# Patient Record
Sex: MALE | Race: WHITE | URBAN - METROPOLITAN AREA
[De-identification: names, ages, dates, MRNs, and addresses within clinical notes are randomized per-mention and may not be internally consistent; named-entity substitution may affect disease eponyms.]

---

## 2020-03-11 ENCOUNTER — OFFICE VISIT (OUTPATIENT)
Dept: URBAN - METROPOLITAN AREA CLINIC 23 | Facility: CLINIC | Age: 70
End: 2020-03-11
Payer: MEDICARE

## 2020-03-11 DIAGNOSIS — H25.13 AGE-RELATED NUCLEAR CATARACT, BILATERAL: Primary | ICD-10-CM

## 2020-03-11 DIAGNOSIS — H40.033 ANATOMICAL NARROW ANGLE, BILATERAL: ICD-10-CM

## 2020-03-11 DIAGNOSIS — H52.4 PRESBYOPIA: ICD-10-CM

## 2020-03-11 PROCEDURE — 92004 COMPRE OPH EXAM NEW PT 1/>: CPT | Performed by: OPHTHALMOLOGY

## 2020-03-11 ASSESSMENT — VISUAL ACUITY
OS: 20/25
OD: 20/60

## 2020-03-11 ASSESSMENT — INTRAOCULAR PRESSURE
OS: 12
OD: 11

## 2020-03-11 ASSESSMENT — KERATOMETRY
OD: 44.13
OS: 43.88

## 2020-03-11 NOTE — IMPRESSION/PLAN
Impression: Age-related nuclear cataract, bilateral: H25.13. Condition: quality of life issue. Symptoms: could improve with surgery. Vision: vision affected. Plan: Discussed diagnosis in detail with patient. Discussed treatment options with patient. Discussed risks of progression. Surgical risks and benefits were discussed, explained and understood by patient. Patient defers surgical treatment. Glasses Rx was given today.

## 2020-03-11 NOTE — IMPRESSION/PLAN
Impression: Anatomical narrow angle, bilateral: H40.033. s/p PI OU, IOP stable. Plan: Discussed diagnosis in detail with patient. No treatment is required at this time. Will continue to monitor IOP.

## 2023-05-09 ENCOUNTER — OFFICE VISIT (OUTPATIENT)
Dept: URBAN - METROPOLITAN AREA CLINIC 16 | Facility: CLINIC | Age: 73
End: 2023-05-09
Payer: COMMERCIAL

## 2023-05-09 DIAGNOSIS — H40.033 ANATOMICAL NARROW ANGLE, BILATERAL: ICD-10-CM

## 2023-05-09 DIAGNOSIS — H52.4 PRESBYOPIA: ICD-10-CM

## 2023-05-09 DIAGNOSIS — Z96.1 PRESENCE OF INTRAOCULAR LENS: ICD-10-CM

## 2023-05-09 DIAGNOSIS — H40.013 OPEN ANGLE WITH BORDERLINE FINDINGS, LOW RISK, BILATERAL: Primary | ICD-10-CM

## 2023-05-09 PROCEDURE — 92004 COMPRE OPH EXAM NEW PT 1/>: CPT | Performed by: OPTOMETRIST

## 2023-05-09 ASSESSMENT — INTRAOCULAR PRESSURE
OD: 14
OS: 14

## 2023-05-09 ASSESSMENT — KERATOMETRY
OD: 43.38
OS: 43.38

## 2023-05-09 NOTE — IMPRESSION/PLAN
Impression: Open angle with borderline findings, low risk, bilateral: H40.013. Plan: OAG suspect d/t large c/d ratio. IOP normal today. No drops required at this time. 

RTC x 6 months for IOP check/RNFL/VF 24-2 Use unscented foam hand sanitizers more than hand washing      The moisturizer recommended, Cetaphil Cream (in the jar), may be used to both treat and prevent dry skin.  Our skin tends to be driest on the legs, followed by the arms and then the trunk.  The best time to apply the moisturizer cream is immediately after toweling dry.  The cream can be applied without a bath or shower on the days you do not bathe and other times during the day, as needed for dry itchy skin. Use a mild soap like Dove Sensitive Skin Bar to bathe with.      CRYOTHERAPY/   LIQUID NITROGEN THERAPY      Cryotherapy is used to treat warts, seborrheic keratoses, actinic keratoses, and other benign and pre-malignant lesions.    Cryotherapy destroys tissue by direct freezing and thawing of skin lesions.  Larger lesions may require multiple treatments or longer freezing and thawing cycles.    Side effects include:     Swelling of area treated and the surrounding area (especially on face)   Mild to moderate pain   Redness around the areas     Blisters   Numbness   Mild scarring   Local pigment changes    If you develop blisters, you may:    1)  Sterilize a needle with heat or rubbing alcohol and puncture the blister.    2)  Apply Vaseline or Aquaphor and a Band-Aid.    Call the clinic if you develop:  1)  Large blisters that do not heal within 3 days.    2)  Bleeding.    3)  Open sores that do not heal.    4)  Open sores that have draining pus, spreading redness, tenderness or       warmth.           5)  Return to clinic if lesion persists after 1 month.      What if I am having some discomfort?  Following minor procedure, the discomfort is usually mild.  You may use Tylenol, Extra Strength Tylenol, Naproxen or Ibuprofen.    If you are already taking daily aspirin or other blood thinners, you do NOT have to discontinue your daily use.    For any concerns, call the Dermatology clinic at:  • [phone], during business hours  Monday-Friday  • [phone] evenings after 5 pm, weekends, holidays  (rev 9/15)     We recommend you use Nitrile gloves anytime you are coming into contact with chemicals including dish detergent, gardening, cleaning chemicals, mechanical chemicals or products, or working with chemicals that may be causing the rash on your hands. These can be found at most retailers including [employer] and [employer]. These can also be purchased online if you would prefer. The best way to sanitize your hands once the \"cracks\" are healed (if any) is alcohol based hand . This has proven to be less harsh on your skin than regular soap and hand washing. You should wash your hands with soap and water anytime your hands are visibly soiled.

## 2023-12-28 ENCOUNTER — OFFICE VISIT (OUTPATIENT)
Dept: URBAN - METROPOLITAN AREA CLINIC 26 | Facility: CLINIC | Age: 73
End: 2023-12-28
Payer: MEDICARE

## 2023-12-28 DIAGNOSIS — H04.123 DRY EYE SYNDROME OF BILATERAL LACRIMAL GLANDS: ICD-10-CM

## 2023-12-28 DIAGNOSIS — H35.3131 NONEXUDATIVE AGE-RELATED MACULAR DEGENERATION, BILATERAL, EARLY DRY STAGE: ICD-10-CM

## 2023-12-28 DIAGNOSIS — H40.013 OPEN ANGLE WITH BORDERLINE FINDINGS, LOW RISK, BILATERAL: Primary | ICD-10-CM

## 2023-12-28 PROCEDURE — 92004 COMPRE OPH EXAM NEW PT 1/>: CPT | Performed by: OPTOMETRIST

## 2023-12-28 PROCEDURE — 92083 EXTENDED VISUAL FIELD XM: CPT | Performed by: OPTOMETRIST

## 2023-12-28 PROCEDURE — 92134 CPTRZ OPH DX IMG PST SGM RTA: CPT | Performed by: OPTOMETRIST

## 2023-12-28 ASSESSMENT — VISUAL ACUITY
OD: 20/30
OS: 20/25

## 2023-12-28 ASSESSMENT — KERATOMETRY
OS: 43.00
OD: 43.25